# Patient Record
Sex: MALE | Race: WHITE | Employment: OTHER | URBAN - METROPOLITAN AREA
[De-identification: names, ages, dates, MRNs, and addresses within clinical notes are randomized per-mention and may not be internally consistent; named-entity substitution may affect disease eponyms.]

---

## 2017-08-07 PROBLEM — N40.1 BENIGN NON-NODULAR PROSTATIC HYPERPLASIA WITH LOWER URINARY TRACT SYMPTOMS: Status: ACTIVE | Noted: 2017-08-07

## 2017-08-07 PROBLEM — N31.2 ATONIC BLADDER: Status: ACTIVE | Noted: 2017-08-07

## 2022-03-19 PROBLEM — N40.1 BENIGN NON-NODULAR PROSTATIC HYPERPLASIA WITH LOWER URINARY TRACT SYMPTOMS: Status: ACTIVE | Noted: 2017-08-07

## 2022-03-19 PROBLEM — N31.2 ATONIC BLADDER: Status: ACTIVE | Noted: 2017-08-07

## 2022-06-07 ENCOUNTER — HOSPITAL ENCOUNTER (OUTPATIENT)
Dept: PHYSICAL THERAPY | Age: 67
Discharge: HOME | End: 2022-06-07
Payer: MEDICARE

## 2022-06-07 PROCEDURE — 97530 THERAPEUTIC ACTIVITIES: CPT

## 2022-06-07 PROCEDURE — 97162 PT EVAL MOD COMPLEX 30 MIN: CPT

## 2022-06-07 NOTE — PROGRESS NOTES
[facility] at [address] [facility] [address] [facility], [address] [place] [address]  Phone [phone]   Fax: [phone]    Physical Therapy Assistive Device Recommendation      Patient's name Skip Garza   Referring Physician:Dr. Montserrat Lai Burciaga   Treatment Diagnosis: Imbalance   YOB: 1955   MRN: [id number]     Onset Date:May 2022  Date of Service:6/7/2022    Total Treatments: 1       Patient may benefit from use or rolling walker or rollator walker due to imbalance & current fall risk as well as R/L/bilateral LE weakness given benefits with use of RW with ambulation with physical therapy treatments. If you are in agreement, please sign below. Thank you for this referral.        Thank you,  Therapist: Mildred Barajas, RAZA  Date: 6/7/2022  Time:5:23 PM  _______________________________________________________________________    I certify that the above Therapy Services are being furnished while the patient is under my care. I agree with the treatment plan and certify that this therapy is necessary. Y or N I have read the above and request that my patient continue as recommended. Y or N I have read the above report and request that my patient continue therapy with the following changes/special instructions:______________________________________________________________  Y or N I have read the above report and do not wish to have assistive device provided    Physician's Signature:____________________  Date:________________    Please sign and return to [facility] at [address] [facility], [address] [place] [address]  Or fax to [phone].

## 2022-06-07 NOTE — PROGRESS NOTES
PHYSICAL THERAPY - DAILY TREATMENT NOTE    Patient Name: Dorene Farias        Date: 2022  : 1955   YES Patient  Verified  Visit #:     Insurance: Payor: Diane Farr / Plan: [place] MED[place] PART A & B / Product Type: Medicare /      In time: 3:50 P Out time: 4:30 P   Total Treatment Time: 40     BCBS/Medicare Time Tracking (below)   Total Timed Codes (min):  40 1:1 Treatment Time:  40     TREATMENT AREA =  Other abnormalities of gait and mobility [R26.89]  SUBJECTIVE  Pain Level (on 0 to 10 scale):  5-6   10   Medication Changes/New allergies or changes in medical history, any new surgeries or procedures?     NO    If yes, update Summary List   Subjective Functional Status/Changes:  []  No changes reported     See POC           Modalities Rationale:   Patient deferred   min [] Estim, type/location:                                      []  att     []  unatt     []  w/US     []  w/ice    []  w/heat    min []  Mechanical Traction: type/lbs                   []  pro   []  sup   []  int   []  cont    []  before manual    []  after manual    min []  Ultrasound, settings/location:      min []  Iontophoresis w/ dexamethasone, location:                                               []  take home patch       []  in clinic    min []  Ice     []  Heat    location/position:     min []  Vasopneumatic Device, press/temp:    If using vaso (only need to measure limb vaso being performed on)      pre-treatment girth :       post-treatment girth :       measured at (landmark location) :      min []  Other:    [] Skin assessment post-treatment (if applicable):    []  intact    []  redness- no adverse reaction                  []redness - adverse reaction:        10 min Therapeutic Activity: [x]  See flow sheet   Rationale:    increase ROM and increase strength to improve the patients ability to return to safe sit to stand transfers    5  NB min Gait Training:  _100__ feet with _RW__ device on level surfaces with __SBA/supervision_ level of assistance   Rationale: To improve ambulation safety and efficiency in order to improve patient's ability to safely ambulate at home for self care. Billed With/As:   [] TE   [] TA   [] Neuro   [] Self Care Patient Education: [x] Review HEP    [] Progressed/Changed HEP based on:   [] positioning   [] body mechanics   [] transfers   [] heat/ice application    [] other:      Other Objective/Functional Measures:    TA: patient educated on safe sit to stand transfers with use of RW as well as standing & gait training with ambulation to decrease fall risk at home     Post Treatment Pain Level (on 0 to 10) scale:   5  / 10     ASSESSMENT  Assessment/Changes in Function:     See POC     []  See Progress Note/Recertification   Patient will continue to benefit from skilled PT services to modify and progress therapeutic interventions, address functional mobility deficits, address ROM deficits, address strength deficits, analyze and address soft tissue restrictions, analyze and cue movement patterns, analyze and modify body mechanics/ergonomics, assess and modify postural abnormalities, address imbalance/dizziness and instruct in home and community integration to attain remaining goals.    Progress toward goals / Updated goals:    Progressing towards goals established at [address] [facility] [address]  []  Upgrade activities as tolerated YES Continue plan of care   []  Discharge due to :    []  Other:      Therapist: Mildred Barajas PT    Date: 6/7/2022 Time: 5:30 PM     Future Appointments   Date Time Provider Rachid Conner   12/15/2022  1:15 PM Juliette Ceja MD [address]

## 2022-06-07 NOTE — PROGRESS NOTES
[address] [place] [address] PHYSICAL THERAPY AT [address] [place] [address] [facility], [address] [place] [address], [address]  Phone: [phone]  Fax: [phone] / [address] [place] [address] PHYSICAL THERAPY SERVICES  Patient Name: Nena Tinoco : 1955   Medical   Diagnosis: Other abnormalities of gait and mobility [R26.89] Treatment Diagnosis: Gait/imbalance   Onset Date: 1-2 months prior to eval     Referral Source: Romeo Romero MD [facility]): 2022   Prior Hospitalization: See medical history Provider #: [id number]   Prior Level of Function: Previous fall history, limited with tolerance to ambulation without support   Comorbidities: H/o HTN, depression, arthritis, visual & hearing impairment, DM   Medications: Verified on Patient Summary List   The Plan of Care and following information is based on the information from the initial evaluation.   ==================================================================================  Assessment / key information:  Patient is a pleasant 61 y.o. male who [facility] with c/o imbalance. Patient reports progressive worsening of balance over the last  ~ 2.5 years when he changed his diet & lost ~30 lbs & started to notice progressive weakness of B LE. He report L>R hip/knee pain, he is currently being treated by a chiropractor 1x.month. He reports additional c/o bucking/giving way on L LE & at least 3 falls in the last month with all falls associated with ambulation. He is currently ambulating short distances with SPC on R. He is currently living in an apartment with a roommate, he resides on the second floor. He uses SPC & 1 HR at home to get to his bedroom & is able to cook using counter for support. Upon objective evaluation, patient entered clinic ambulating with [facility] & patient was given RW to ambulate around clinic.   He is able to ascend stairs using B HR, leading with L LE & descending leading with L LE with supervision for safety. Patient ambulates using a step gait better, leading with L LE with supervision/SBA for safe ambulation with RW today. He scored 20/56 on Ramirez Balance Assessment indicating significant risk of fall with ADLs. He demonstrates full B knee AROM, v/c for TKE with ambulation & to encourage = WBing.  V/c for safe performance of sit to stand transfers, uncontrolled descent with stand to sit transfers. Discussed use of RW with patient's counselor who was present for initial evaluation. Please see attached request for RW for home use. They will call back to schedule f/u treatments after they set up transportation for patient. Patient can benefit PT interventions to improve strength, decrease fall risk with ambulation & ADLs to facilitate return to unlimited ADLs, work activities & overall functional status.   ==================================================================================  Eval Complexity: History HIGH Complexity :3+ comorbidities / personal factors will impact the outcome/ POC ;  Examination  MEDIUM Complexity : 3 Standardized tests and measures addressing body structure, function, activity limitation and / or participation in recreation ; Presentation MEDIUM Complexity : Evolving with changing characteristics ;   Decision Making MEDIUM Complexity : FOTO score of 26-74; Overall Complexity MEDIUM  Problem List: pain affecting function, decrease ROM, decrease strength, impaired gait/ balance, decrease ADL/ functional abilitiies, decrease activity tolerance, decrease flexibility/ joint mobility and decrease transfer abilities   Treatment Plan may include any combination of the following: Therapeutic exercise, Therapeutic activities, Neuromuscular re-education, Physical agent/modality, Gait/balance training, Manual therapy, Aquatic therapy, Patient education, Self Care training, Functional mobility training, Home safety training and Stair training  Patient / Family readiness to learn indicated by: asking questions, trying to perform skills and interest  Persons(s) to be included in education: patient (P)  Barriers to Learning/Limitations: None  Measures taken:    Patient Goal (s): \"an improvement\"   Patient self reported health status: good  Rehabilitation Potential: good   Short Term Goals: To be accomplished in  2  weeks:  1) Establish HEP to prevent further disability. 2) Patient will be ambulate with RW for household distances with no safety concerns. 3) Improve FOTO score from 41 points to > or = 45 points indicating improved tolerance with ADLs. 4) Patient will report no further incidence of falls with use of fall prevention techniques at home.  Long Term Goals: To be accomplished in  3  weeks:  1) Improve FOTO score from 45 points to > or = 50 points indicating improved tolerance with ADLs. 2) Patient to improve score on Ramirez balance assessment from 20/56 to > or = 28/56 indicating decreased r/o fall with ADLs. 3) Patient will be ambulate with RW for limited community distances with no safety concerns. 4) Patient to be independent & compliant with HEP in preparation for D/C. Frequency / Duration:   Patient to be seen  3  times per week for 3  weeks:  Patient / Caregiver education and instruction: self care, activity modification, brace/ splint application and exercises    Therapist Signature: FROILAN Kay cert MDT Date: 3/9/6351   Certification Period: 6-07-22 to 9-05-22 Time: 4:00 PM   =================================================================================  I certify that the above Physical Therapy Services are being furnished while the patient is under my care. I agree with the treatment plan and certify that this therapy is necessary.     Physician Signature:                                                             Date:                                     Time: Mohit Ibarra MD

## 2022-06-23 ENCOUNTER — APPOINTMENT (OUTPATIENT)
Dept: PHYSICAL THERAPY | Age: 67
End: 2022-06-23
Payer: MEDICARE

## 2022-06-27 ENCOUNTER — APPOINTMENT (OUTPATIENT)
Dept: PHYSICAL THERAPY | Age: 67
End: 2022-06-27
Payer: MEDICARE

## 2022-06-28 ENCOUNTER — TELEPHONE (OUTPATIENT)
Dept: PHYSICAL THERAPY | Age: 67
End: 2022-06-28

## 2022-08-30 ENCOUNTER — TELEPHONE (OUTPATIENT)
Dept: PHYSICAL THERAPY | Age: 67
End: 2022-08-30

## 2022-08-30 NOTE — PROGRESS NOTES
[address] [place] [address] PHYSICAL THERAPY AT [address] [place] [address]  [facility] [address] ,[address], [address]  Phone: [phone]  Fax: [phone] FOR PHYSICAL THERAPY          Patient Name: Sana Virk : 1955   Treatment/Medical Diagnosis: Other abnormalities of gait and mobility [R26.89]   Onset Date: 1-2 mos prior to eval     Referral Source: Eder Padgett MD [facility]): 22   Prior Hospitalization: See Medical History Provider #: [id number]   Prior Level of Function: Previous fall history, limited with tolerance to ambulation without support   Comorbidities: H/o HTN, depression, arthritis, visual & hearing impairment, DM   Medications: Verified on Patient Summary List   Visits from [facility]: 1 Missed Visits: 2       Key Functional Changes/Progress: Mr. Eduardo Ruiz was only seen for initial evaluation on 22, he had cancelled 2 scheduled follow up treatments, one due to lack of transportation & other appt due to recent hospitalization (per patient report). Spoke with  on 22 who was calling to follow up on patient's treatment, No further contact has been made with clinic to continue with PT, therefore patient to be discharged to home program.        Assessments/Recommendations: Other: Eval only, self DC     If you have any questions/comments please contact us directly at [phone]. Thank you for allowing us to assist in the care of your patient.     Therapist Signature: FROILAN Kay, cert MDT Date:    Reporting Period: 22 to 22 Time: 3:36 PM

## 2022-09-15 PROBLEM — A41.9 SEPSIS (HCC): Status: ACTIVE | Noted: 2022-06-26

## 2022-09-15 PROBLEM — R29.6 RECURRENT FALLS: Status: ACTIVE | Noted: 2022-06-26

## 2022-09-15 PROBLEM — N39.0 UTI (URINARY TRACT INFECTION): Status: ACTIVE | Noted: 2022-06-26

## 2022-09-15 PROBLEM — M62.82 NON-TRAUMATIC RHABDOMYOLYSIS: Status: ACTIVE | Noted: 2022-06-26

## 2022-10-15 PROBLEM — N39.0 UTI (URINARY TRACT INFECTION): Status: RESOLVED | Noted: 2022-06-26 | Resolved: 2022-10-15

## 2024-05-02 ENCOUNTER — CARE COORDINATION (OUTPATIENT)
Dept: OTHER | Facility: CLINIC | Age: 69
End: 2024-05-02

## 2024-05-02 NOTE — CARE COORDINATION
Ambulatory Care Coordination Note     2024 9:08 AM     Patient Current Location:  [place]     This patient was received as a referral from [facility] [employer] report .    ACM contacted the patient by telephone. Verified name and  with patient as identifiers. Provided introduction to self, and explanation of the ACM role. Patient declined care management services at this time. Patient states he is now living in an Assisted Living/Senior Care residence ([address], [place], [address]) where he has a PCP, pharmacy, and nursing services to assist him with any medical needs he has. Pt. stated he is doing well since his hospitalization in December, and received rehab care after the inpatient stay. Patient states he does not need any care management services at this time, but has ACM's number if he should in the future. Patient was thankful for the call.         ACM: Evangelina Jessica RN       PCP/Specialist follow up:   Future Appointments         Provider Specialty Dept Phone    2025 10:40 AM Darien Puri PA Urology [phone]          Evangelina Jessica RN BSN  Ambulatory Care Manager  [phone]  [email or web address]